# Patient Record
Sex: MALE | Race: BLACK OR AFRICAN AMERICAN | NOT HISPANIC OR LATINO
[De-identification: names, ages, dates, MRNs, and addresses within clinical notes are randomized per-mention and may not be internally consistent; named-entity substitution may affect disease eponyms.]

---

## 2021-11-29 PROBLEM — Z00.00 ENCOUNTER FOR PREVENTIVE HEALTH EXAMINATION: Status: ACTIVE | Noted: 2021-11-29

## 2021-11-30 ENCOUNTER — NON-APPOINTMENT (OUTPATIENT)
Age: 26
End: 2021-11-30

## 2021-11-30 ENCOUNTER — APPOINTMENT (OUTPATIENT)
Dept: ORTHOPEDIC SURGERY | Facility: CLINIC | Age: 26
End: 2021-11-30
Payer: MEDICAID

## 2021-11-30 DIAGNOSIS — S62.91XA UNSPECIFIED FRACTURE OF RIGHT WRIST AND HAND, INITIAL ENCOUNTER FOR CLOSED FRACTURE: ICD-10-CM

## 2021-11-30 PROCEDURE — 29075 APPL CST ELBW FNGR SHORT ARM: CPT | Mod: RT

## 2021-11-30 PROCEDURE — 99203 OFFICE O/P NEW LOW 30 MIN: CPT | Mod: 25

## 2021-11-30 PROCEDURE — 73130 X-RAY EXAM OF HAND: CPT | Mod: RT

## 2021-12-09 NOTE — ADDENDUM
[FreeTextEntry1] : I, Shanna Shaw wrote this note acting as a scribe for Dr. Clay Mota on Nov 30, 2021.

## 2021-12-09 NOTE — PHYSICAL EXAM
[de-identified] : Patient is WDWN, alert, and in no acute distress. Breathing is unlabored. He is grossly oriented to person, place, and time.\par \par Right Hand:\par Gross edema noted dorsally to the hand\par ROM to the third and fourth metacarpal not accessed due to injury.\par FUll ROM of all other digits\par Diminished sensation to the thumb. [de-identified] : Right hand Xrays  3 views reveal a minimally displaced  transverse midshaft fracture of the long and ring finger metacarpals.

## 2021-12-09 NOTE — DISCUSSION/SUMMARY
[FreeTextEntry1] : The underlying pathophysiology was reviewed with the patient. XR films were reviewed with the patient. Discussed at length the nature of the patient’s condition. The right hand symptoms appear secondary to fractures of the third and fourth metacarpals.\par \par At this time, I am recommending casting of the fracture for four weeks due to the nature of the injury as documented above.\par He was placed into a right short arm cast on 11/30/21.\par Proper cast care instructions were reviewed with the patient.\par He was encouraged to use the hand for all ADLs as tolerated while casted.\par He was instructed on ROM exercises of the digits and elevation of the hand. He was additionally advised on pumping of the hand while casted to reduce swelling.\par \par All questions answered, understanding verbalized. Patient in agreement with plan of care. Follow up in 4 weeks for repeat xrays and cast removal. He will follow up with an Orthopedist in [place] as he will be returning home.

## 2021-12-09 NOTE — END OF VISIT
[FreeTextEntry3] : All medical record entries made by the Scribe were at my,  Dr. Clay Mota MD., direction and personally dictated by me on 11/30/2021. I have personally reviewed the chart and agree that the record accurately reflects my personal performance of the history, physical exam, assessment and plan.